# Patient Record
Sex: MALE | Race: BLACK OR AFRICAN AMERICAN
[De-identification: names, ages, dates, MRNs, and addresses within clinical notes are randomized per-mention and may not be internally consistent; named-entity substitution may affect disease eponyms.]

---

## 2020-03-19 ENCOUNTER — HOSPITAL ENCOUNTER (OUTPATIENT)
Dept: HOSPITAL 93 - LAB | Age: 20
Discharge: HOME | End: 2020-03-19
Attending: GENERAL PRACTICE
Payer: COMMERCIAL

## 2020-03-19 DIAGNOSIS — E78.49: ICD-10-CM

## 2020-03-19 DIAGNOSIS — R42: Primary | ICD-10-CM

## 2020-03-19 DIAGNOSIS — E55.9: ICD-10-CM

## 2020-03-19 DIAGNOSIS — Z00.00: ICD-10-CM

## 2020-07-29 ENCOUNTER — HOSPITAL ENCOUNTER (OUTPATIENT)
Dept: HOSPITAL 93 - LAB | Age: 20
Discharge: HOME | End: 2020-07-29
Attending: GENERAL PRACTICE
Payer: COMMERCIAL

## 2020-07-29 DIAGNOSIS — R60.0: Primary | ICD-10-CM

## 2020-07-29 DIAGNOSIS — I87.2: ICD-10-CM

## 2020-08-05 ENCOUNTER — HOSPITAL ENCOUNTER (OUTPATIENT)
Dept: HOSPITAL 93 - NUCLEAR | Age: 20
Discharge: HOME | End: 2020-08-05
Attending: GENERAL PRACTICE
Payer: COMMERCIAL

## 2020-08-05 DIAGNOSIS — R60.0: Primary | ICD-10-CM

## 2020-08-05 DIAGNOSIS — I87.2: ICD-10-CM

## 2020-08-10 ENCOUNTER — HOSPITAL ENCOUNTER (OUTPATIENT)
Dept: HOSPITAL 93 - NUCLEAR | Age: 20
Discharge: HOME | End: 2020-08-10
Attending: GENERAL PRACTICE
Payer: COMMERCIAL

## 2020-08-10 DIAGNOSIS — I87.2: ICD-10-CM

## 2020-08-10 DIAGNOSIS — I73.9: Primary | ICD-10-CM

## 2020-08-10 DIAGNOSIS — R60.0: ICD-10-CM

## 2021-11-28 ENCOUNTER — HOSPITAL ENCOUNTER (EMERGENCY)
Age: 21
Discharge: HOME | End: 2021-11-28
Attending: STUDENT IN AN ORGANIZED HEALTH CARE EDUCATION/TRAINING PROGRAM
Payer: COMMERCIAL

## 2021-11-28 VITALS
TEMPERATURE: 98.5 F | HEIGHT: 65 IN | BODY MASS INDEX: 27.99 KG/M2 | WEIGHT: 168 LBS | OXYGEN SATURATION: 97 % | SYSTOLIC BLOOD PRESSURE: 138 MMHG | RESPIRATION RATE: 16 BRPM | DIASTOLIC BLOOD PRESSURE: 89 MMHG | HEART RATE: 77 BPM

## 2021-11-28 DIAGNOSIS — S61.011A LACERATION OF RIGHT THUMB WITHOUT FOREIGN BODY WITHOUT DAMAGE TO NAIL, INITIAL ENCOUNTER: Primary | ICD-10-CM

## 2021-11-28 PROCEDURE — 90471 IMMUNIZATION ADMIN: CPT | Performed by: EMERGENCY MEDICINE

## 2021-11-28 PROCEDURE — 12002 RPR S/N/AX/GEN/TRNK2.6-7.5CM: CPT

## 2021-11-28 PROCEDURE — 6360000002 HC RX W HCPCS: Performed by: EMERGENCY MEDICINE

## 2021-11-28 PROCEDURE — 99283 EMERGENCY DEPT VISIT LOW MDM: CPT

## 2021-11-28 PROCEDURE — 90715 TDAP VACCINE 7 YRS/> IM: CPT | Performed by: EMERGENCY MEDICINE

## 2021-11-28 RX ORDER — CEPHALEXIN 500 MG/1
500 CAPSULE ORAL 2 TIMES DAILY
Qty: 14 CAPSULE | Refills: 0 | Status: SHIPPED | OUTPATIENT
Start: 2021-11-28 | End: 2021-12-05

## 2021-11-28 RX ADMIN — TETANUS TOXOID, REDUCED DIPHTHERIA TOXOID AND ACELLULAR PERTUSSIS VACCINE, ADSORBED 0.5 ML: 5; 2.5; 8; 8; 2.5 SUSPENSION INTRAMUSCULAR at 06:58

## 2021-11-28 ASSESSMENT — PAIN SCALES - GENERAL: PAINLEVEL_OUTOF10: 4

## 2021-11-28 NOTE — ED PROVIDER NOTES
Primary Care Physician: No primary care provider on file. Attending Physician: Ravin Linares MD     History   Chief Complaint   Patient presents with    Finger Injury     pt states that he cut his finger with a knife [phone] at work at a [employer] HPI   Therese Shea is a 24 y.o. male with no medical history who presents this morning with laceration on his right thumb. Patient stated that he was at work when he was accidentally injured by another coworker. Otherwise he was in good state of health before incident. Denies any other complaints at this time. History reviewed. No pertinent past medical history. History reviewed. No pertinent surgical history. History reviewed. No pertinent family history. Social History     Socioeconomic History    Marital status: Single     Spouse name: None    Number of children: None    Years of education: None    Highest education level: None   Occupational History    None   Tobacco Use    Smoking status: Never Smoker    Smokeless tobacco: Never Used   Substance and Sexual Activity    Alcohol use: Yes     Comment: occ    Drug use: Never    Sexual activity: None   Other Topics Concern    None   Social History Narrative    None     Social Determinants of Health     Financial Resource Strain:     Difficulty of Paying Living Expenses: Not on file   Food Insecurity:     Worried About Running Out of Food in the Last Year: Not on file    Farzana of Food in the Last Year: Not on file   Transportation Needs:     Lack of Transportation (Medical): Not on file    Lack of Transportation (Non-Medical):  Not on file   Physical Activity:     Days of Exercise per Week: Not on file    Minutes of Exercise per Session: Not on file   Stress:     Feeling of Stress : Not on file   Social Connections:     Frequency of Communication with Friends and Family: Not on file    Frequency of Social Gatherings with Friends and Family: Not on file    Attends by:  Patient    Risks discussed:  Infection, need for additional repair and nerve damage    Alternatives discussed:  No treatment  Anesthesia (see MAR for exact dosages): Anesthesia method:  Local infiltration    Local anesthetic:  Lidocaine 2% w/o epi  Laceration details:     Location: thumb right. Wound length (cm): 4 cm. Laceration depth: 1cm   Repair type:     Repair type:  Simple  Pre-procedure details:     Preparation:  Patient was prepped and draped in usual sterile fashion  Exploration:     Wound extent: no foreign bodies/material noted      Contaminated: yes    Treatment:     Area cleansed with:  Saline    Amount of cleaning:  Standard    Visualized foreign bodies/material removed: no    Skin repair:     Repair method:  Sutures    Suture size:  4-0    Suture material:  Nylon    Suture technique:  Simple interrupted    Number of sutures: 8. Approximation:     Approximation:  Close  Post-procedure details:     Dressing:  Antibiotic ointment and bulky dressing    Patient tolerance of procedure: Tolerated well, no immediate complications        ASSESSMENT AND PLAN:  Chandana Tucker is a 24 y.o. male with no medical history who presents this morning with laceration on his right thumb. Patient stated that he was at work when he was accidentally injured by another coworker. Otherwise he was in good state of health before incident. Denies any other complaints at this time. On exam nontoxic in no acute distress with a laceration on the right thumb which was sutured. Patient is not up-to-date with tetanus she was given a tetanus shot. I did recommend returning to the emergency room or follow-up with primary care doctor in 10 to 14 days for suture removal.  Patient was also given Keflex 500 mg twice daily for 7 days. ClINICAL IMPRESSION:  1.  Laceration of right thumb without foreign body without damage to nail, initial encounter          PATIENT REFERRED TO:  [facility]) Pre-Services  [phone]  Schedule an appointment as soon as possible for a visit in 2 days        DISCHARGE MEDICATIONS:  New Prescriptions    CEPHALEXIN (KEFLEX) 500 MG CAPSULE    Take 1 capsule by mouth 2 times daily for 7 days     DISCONTINUED MEDICATIONS:  Discontinued Medications    No medications on file     DISPOSITION Decision To Discharge 11/28/2021 07:02:18 AM  -We have instructed the patient, Monty Hair) to return to the ED or call his PCP if his pain/symptoms worsen. -Findings and recommendations explained to patient. He expressed understanding and agreed with the plan. Gilda Whiting MD (electronically signed)  11/28/2021  _________________________________________________________________________________________  _________________________________________________________________________________________  This record is transcribed utilizing voice recognition technology. There are inherent limitations in this technology. In addition, there may be limitations in editing of this report. If there are any discrepancies, please contact me directly.         Gilda Whiting MD  11/28/21 2706